# Patient Record
Sex: FEMALE | Race: WHITE | NOT HISPANIC OR LATINO | Employment: OTHER | URBAN - METROPOLITAN AREA
[De-identification: names, ages, dates, MRNs, and addresses within clinical notes are randomized per-mention and may not be internally consistent; named-entity substitution may affect disease eponyms.]

---

## 2023-03-06 DIAGNOSIS — E78.5 HYPERLIPIDEMIA, UNSPECIFIED HYPERLIPIDEMIA TYPE: Primary | ICD-10-CM

## 2023-03-06 RX ORDER — ROSUVASTATIN CALCIUM 5 MG/1
5 TABLET, COATED ORAL DAILY
Qty: 90 TABLET | Refills: 1 | Status: SHIPPED | OUTPATIENT
Start: 2023-03-06 | End: 2023-08-28 | Stop reason: SDUPTHER

## 2023-03-06 RX ORDER — ROSUVASTATIN CALCIUM 5 MG/1
1 TABLET, COATED ORAL DAILY
COMMUNITY
Start: 2012-11-29 | End: 2023-03-06 | Stop reason: SDUPTHER

## 2023-08-28 DIAGNOSIS — E78.5 HYPERLIPIDEMIA, UNSPECIFIED HYPERLIPIDEMIA TYPE: ICD-10-CM

## 2023-08-28 PROBLEM — J98.4 CAVITARY LESION OF LUNG: Status: ACTIVE | Noted: 2023-08-28

## 2023-08-28 PROBLEM — M85.88 OSTEOPENIA OF LUMBAR SPINE: Status: ACTIVE | Noted: 2023-08-28

## 2023-08-28 PROBLEM — D12.6 TUBULAR ADENOMA OF COLON: Status: ACTIVE | Noted: 2023-08-28

## 2023-08-28 PROBLEM — E55.9 VITAMIN D DEFICIENCY: Status: ACTIVE | Noted: 2023-08-28

## 2023-08-28 PROBLEM — R73.9 BORDERLINE HYPERGLYCEMIA: Status: ACTIVE | Noted: 2023-08-28

## 2023-08-28 PROBLEM — J98.4 CAVITARY PNEUMONIA: Status: ACTIVE | Noted: 2023-08-28

## 2023-08-28 PROBLEM — R91.8 LUNG NODULES: Status: ACTIVE | Noted: 2023-08-28

## 2023-08-28 PROBLEM — R91.8 RIGHT LOWER LOBE LUNG MASS: Status: ACTIVE | Noted: 2023-08-28

## 2023-08-28 PROBLEM — J32.9 CHRONIC SINUSITIS: Status: ACTIVE | Noted: 2023-08-28

## 2023-08-28 PROBLEM — F32.A MILD DEPRESSION: Status: ACTIVE | Noted: 2023-08-28

## 2023-08-28 PROBLEM — J45.909 ASTHMA (HHS-HCC): Status: ACTIVE | Noted: 2023-08-28

## 2023-08-28 PROBLEM — J90 PLEURAL EFFUSION: Status: ACTIVE | Noted: 2023-08-28

## 2023-08-28 PROBLEM — N95.2 POSTMENOPAUSAL ATROPHIC VAGINITIS: Status: ACTIVE | Noted: 2023-08-28

## 2023-08-28 PROBLEM — J44.9 COPD (CHRONIC OBSTRUCTIVE PULMONARY DISEASE) (MULTI): Status: ACTIVE | Noted: 2023-08-28

## 2023-08-28 PROBLEM — J18.9 CAVITARY PNEUMONIA: Status: ACTIVE | Noted: 2023-08-28

## 2023-08-28 PROBLEM — H40.9 GLAUCOMA: Status: ACTIVE | Noted: 2023-08-28

## 2023-08-28 PROBLEM — K21.9 ESOPHAGEAL REFLUX: Status: ACTIVE | Noted: 2023-08-28

## 2023-08-28 PROBLEM — R05.9 COUGH: Status: ACTIVE | Noted: 2023-08-28

## 2023-08-28 RX ORDER — BUPROPION HYDROCHLORIDE 150 MG/1
1 TABLET ORAL DAILY
COMMUNITY
Start: 2020-11-19 | End: 2023-09-29 | Stop reason: SDUPTHER

## 2023-08-28 RX ORDER — ROSUVASTATIN CALCIUM 5 MG/1
5 TABLET, COATED ORAL DAILY
Qty: 90 TABLET | Refills: 1 | Status: SHIPPED | OUTPATIENT
Start: 2023-08-28 | End: 2024-03-05 | Stop reason: SDUPTHER

## 2023-09-29 DIAGNOSIS — F32.A MILD DEPRESSION: Primary | ICD-10-CM

## 2023-09-29 RX ORDER — BUPROPION HYDROCHLORIDE 150 MG/1
150 TABLET ORAL DAILY
Qty: 90 TABLET | Refills: 0 | Status: SHIPPED | OUTPATIENT
Start: 2023-09-29 | End: 2023-12-26 | Stop reason: SDUPTHER

## 2023-10-31 DIAGNOSIS — J32.9 CHRONIC SINUSITIS, UNSPECIFIED LOCATION: ICD-10-CM

## 2023-10-31 RX ORDER — FLUTICASONE PROPIONATE AND SALMETEROL 100; 50 UG/1; UG/1
1 POWDER RESPIRATORY (INHALATION)
Qty: 1 EACH | Refills: 0 | Status: SHIPPED | OUTPATIENT
Start: 2023-10-31 | End: 2023-11-14 | Stop reason: DRUGHIGH

## 2023-11-14 ENCOUNTER — OFFICE VISIT (OUTPATIENT)
Dept: PULMONOLOGY | Facility: CLINIC | Age: 78
End: 2023-11-14
Payer: MEDICARE

## 2023-11-14 VITALS
WEIGHT: 143 LBS | DIASTOLIC BLOOD PRESSURE: 62 MMHG | HEART RATE: 70 BPM | BODY MASS INDEX: 26.31 KG/M2 | OXYGEN SATURATION: 97 % | RESPIRATION RATE: 16 BRPM | SYSTOLIC BLOOD PRESSURE: 111 MMHG | TEMPERATURE: 98.2 F | HEIGHT: 62 IN

## 2023-11-14 DIAGNOSIS — J45.40 MODERATE PERSISTENT ASTHMA WITHOUT COMPLICATION (HHS-HCC): ICD-10-CM

## 2023-11-14 DIAGNOSIS — R06.09 DYSPNEA ON EXERTION: Primary | ICD-10-CM

## 2023-11-14 PROCEDURE — 1126F AMNT PAIN NOTED NONE PRSNT: CPT | Performed by: INTERNAL MEDICINE

## 2023-11-14 PROCEDURE — 99214 OFFICE O/P EST MOD 30 MIN: CPT | Performed by: INTERNAL MEDICINE

## 2023-11-14 PROCEDURE — 1159F MED LIST DOCD IN RCRD: CPT | Performed by: INTERNAL MEDICINE

## 2023-11-14 RX ORDER — MONTELUKAST SODIUM 10 MG/1
10 TABLET ORAL NIGHTLY
COMMUNITY
End: 2024-04-24 | Stop reason: SDUPTHER

## 2023-11-14 RX ORDER — OMEPRAZOLE 20 MG/1
20 TABLET, DELAYED RELEASE ORAL
COMMUNITY
End: 2023-12-07 | Stop reason: ALTCHOICE

## 2023-11-14 RX ORDER — FLUTICASONE PROPIONATE AND SALMETEROL 250; 50 UG/1; UG/1
1 POWDER RESPIRATORY (INHALATION)
Qty: 60 EACH | Refills: 3 | Status: SHIPPED | OUTPATIENT
Start: 2023-11-14 | End: 2024-03-19 | Stop reason: SDUPTHER

## 2023-11-14 RX ORDER — CETIRIZINE HYDROCHLORIDE 10 MG/1
10 TABLET ORAL DAILY
COMMUNITY

## 2023-11-14 RX ORDER — ERGOCALCIFEROL 1.25 MG/1
1.25 CAPSULE ORAL
COMMUNITY
End: 2023-12-07 | Stop reason: ALTCHOICE

## 2023-11-14 RX ORDER — ALBUTEROL SULFATE 90 UG/1
2 AEROSOL, METERED RESPIRATORY (INHALATION) EVERY 4 HOURS PRN
Qty: 18 G | Refills: 5 | Status: SHIPPED | OUTPATIENT
Start: 2023-11-14 | End: 2024-11-13

## 2023-11-14 ASSESSMENT — ASTHMA QUESTIONNAIRES
QUESTION_4 LAST FOUR WEEKS HOW OFTEN HAVE YOU USED YOUR RESCUE INHALER OR NEBULIZER MEDICATION (SUCH AS ALBUTEROL): NOT AT ALL
ACT_TOTALSCORE: 21
QUESTION_2 LAST FOUR WEEKS HOW OFTEN HAVE YOU HAD SHORTNESS OF BREATH: MORE THAN ONCE A DAY
QUESTION_5 LAST FOUR WEEKS HOW WOULD YOU RATE YOUR ASTHMA CONTROL: COMPLETELY CONTROLLED
QUESTION_1 LAST FOUR WEEKS HOW MUCH OF THE TIME DID YOUR ASTHMA KEEP YOU FROM GETTING AS MUCH DONE AT WORK, SCHOOL OR AT HOME: NONE OF THE TIME
QUESTION_3 LAST FOUR WEEKS HOW OFTEN DID YOUR ASTHMA SYMPTOMS (WHEEZING, COUGHING, SHORTNESS OF BREATH, CHEST TIGHTNESS OR PAIN) WAKE YOU UP AT NIGHT OR EARLIER THAN USUAL IN THE MORNING: NOT AT ALL

## 2023-11-14 ASSESSMENT — ENCOUNTER SYMPTOMS
LOSS OF SENSATION IN FEET: 0
DEPRESSION: 0
OCCASIONAL FEELINGS OF UNSTEADINESS: 0

## 2023-11-14 ASSESSMENT — COLUMBIA-SUICIDE SEVERITY RATING SCALE - C-SSRS
6. HAVE YOU EVER DONE ANYTHING, STARTED TO DO ANYTHING, OR PREPARED TO DO ANYTHING TO END YOUR LIFE?: NO
2. HAVE YOU ACTUALLY HAD ANY THOUGHTS OF KILLING YOURSELF?: NO
1. IN THE PAST MONTH, HAVE YOU WISHED YOU WERE DEAD OR WISHED YOU COULD GO TO SLEEP AND NOT WAKE UP?: NO

## 2023-11-14 ASSESSMENT — PAIN SCALES - GENERAL: PAINLEVEL: 0-NO PAIN

## 2023-11-15 NOTE — PROGRESS NOTES
Department of Medicine  Division of Pulmonary, Critical Care, and Sleep Medicine  Follow-Up Visit  [facility] - [address], [address]    Physician HPI (11/14/2023):  Mrs Kinsey is a 78-year-old female with hx of Asthma (clinical diagnosis), Tobacco use, b/l Pneumonia in November 2016 and chronic sinusitis. She presented to clinic today for follow up.      Interval history 05/13/2019:  Latrice has been doing fairly well since last visit. She is currently on Low-dose of Advair. She has not had any recent respiratory infection or asthma exacerbation. She does report upper airway congestion with seasonal allergies. No persistent cough or increased sputum production. She does not feel she needs to use albuterol frequently.        Follow-up 3/12/2021  Latrice reports stable respiratory status over the past year with no interval history of asthma exacerbation. She is tolerating low-dose of Advair and montelukast. She has not needed to use albuterol very often. Weight has been stable. No purulent sputum production or recurrent fevers.     Follow up 9/15/2021:  Latrice reports a recent presentation to outside hospital ER with acute shortness of breath and right-sided chest pain. CT scan of chest at that time revealed a new cavitary lesion in the right lower lobe that was not seen on previous CT scan. She was recommended to be admitted to the hospital for treatment though she preferred to go home. She was started on third-generation cephalosporin and doxycycline. She reports some clinical improvement since she was started on antibiotic such as improved cough, improved night sweats and right-sided chest pain. She denies acute hemoptysis. No past history of autoimmune diseases or inflammatory arthritis. No acute fevers now.     Follow up 3/14/2022:  Latrice presented to the office today for follow-up. She reports good recovery since her VATS surgery in October 2021. Repeated chest x-ray on October 21 revealed significant  improvement in the right lower lobe pneumonia and complex pleural effusion. She noticed mild restriction with deep inspiration on the right side since surgery but no significant pain. She denies persistent cough or purulent sputum production. Oxygenation has been stable.     Follow up 11/14/2023:  Latrice presented to the office today for follow up. Reports chronic dyspnea on exertion with moderate activities around the home such as climbing more than one flight of stairs. Symptoms usually improve with short acting bronchodilators. No interval hx of acute asthma exacerbation that required hospital admission or ER visits. She is using Advair 100mcg BID. No orthopnea or LE edema. No exertional chest pain, palpitation, diaphoresis, presyncope or syncope.     Immunization History   Administered Date(s) Administered    Moderna COVID-19 vaccine, bivalent, blue cap/gray label *Check age/dose* 10/13/2022    Moderna SARS-CoV-2 Vaccination 02/11/2021, 03/09/2021, 11/18/2021, 05/11/2022    Pneumococcal conjugate vaccine, 13-valent (PREVNAR 13) 10/28/2015    Pneumococcal polysaccharide vaccine, 23-valent, age 2 years and older (PNEUMOVAX 23) 10/21/2013    Td vaccine, age 7 years and older (TDVAX) 03/24/2011       Current Outpatient Medications   Medication Instructions    albuterol (Ventolin HFA) 90 mcg/actuation inhaler 2 puffs, inhalation, Every 4 hours PRN    buPROPion XL (WELLBUTRIN XL) 150 mg, oral, Daily    cetirizine (ZYRTEC) 10 mg, oral, Daily    ergocalciferol (VITAMIN D-2) 1.25 mg, oral, Weekly    fluticasone propion-salmeteroL (Advair Diskus) 250-50 mcg/dose diskus inhaler 1 puff, inhalation, 2 times daily RT, Rinse mouth with water after use to reduce aftertaste and incidence of candidiasis. Do not swallow.    montelukast (SINGULAIR) 10 mg, oral, Nightly    omeprazole OTC (PRILOSEC OTC) 20 mg, oral, Daily before breakfast, Do not crush, chew, or split.    rosuvastatin (CRESTOR) 5 mg, oral, Daily     "    Allergies:  Allergies   Allergen Reactions    Amoxicillin-Pot Clavulanate Other    Brimonidine Unknown     Eyes    Cpm-Pseudoephed-Acetaminophen Hives     Patient can take regular tylenol    Doxylamin-Pse-Dm-Acetaminophen Unknown    Fentanyl Unknown    Hyoscyamine Sulfate Unknown    Levofloxacin Other    Travoprost Swelling     Eyes    Codeine Rash     Nausea & Vomiting          Visit Vitals  /62 (BP Location: Left arm, Patient Position: Sitting, BP Cuff Size: Adult)   Pulse 70   Temp 36.8 °C (98.2 °F) (Temporal)   Resp 16   Ht 1.575 m (5' 2\")   Wt 64.9 kg (143 lb)   SpO2 97%   BMI 26.16 kg/m²   Smoking Status Former   BSA 1.69 m²        Physical Exam  Vitals and nursing note reviewed.   Constitutional:       General: She is not in acute distress.     Appearance: Normal appearance.   HENT:      Head: Normocephalic and atraumatic.      Nose: Nose normal.      Mouth/Throat:      Mouth: Mucous membranes are moist.   Eyes:      Conjunctiva/sclera: Conjunctivae normal.   Cardiovascular:      Rate and Rhythm: Normal rate and regular rhythm.   Pulmonary:      Effort: Pulmonary effort is normal. No respiratory distress.      Breath sounds: Normal breath sounds. No stridor. No wheezing or rhonchi.   Musculoskeletal:      Cervical back: Neck supple.   Skin:     General: Skin is warm and dry.      Coloration: Skin is not jaundiced.   Neurological:      General: No focal deficit present.      Mental Status: She is alert and oriented to person, place, and time. Mental status is at baseline.   Psychiatric:         Mood and Affect: Mood normal.         Behavior: Behavior normal.         Judgment: Judgment normal.              Chest Radiograph     CHEST 2 VIEW 10/21/2021    Narrative  MRN: [id number]  Patient Name: TI HENSON    STUDY:   CHEST 2 VIEW PA AND LAT;  10/21/2021 1:15 pm    INDICATION:  post op followup.    COMPARISON:  10/05/2021 chest radiograph    ACCESSION NUMBER(S):  [id number]    ORDERING " CLINICIAN:  CELINA BILLS    FINDINGS:  PA and lateral radiographs of the chest were provided.    Metallic clips are again seen projecting over the upper abdomen.    CARDIOMEDIASTINAL SILHOUETTE:  Cardiac silhouette is at the top limits of normal in size.    LUNGS:  Small residual right pleural effusion and right basilar atelectasis.  No discernible pneumothorax. The left lung is now clear. No pulmonary  edema.    ABDOMEN:  No remarkable upper abdominal findings.    BONES:  No acute osseous changes.    Impression  1.  Small residual right pleural effusion and mild right basilar  atelectasis.      Laboratory Studies     Lab Results   Component Value Date    WBC 12.0 (H) 10/05/2021    HGB 8.6 (L) 10/05/2021    HCT 27.5 (L) 10/05/2021    MCV 84 10/05/2021     (H) 10/05/2021      Lab Results   Component Value Date    GLUCOSE 85 10/05/2021    CALCIUM 8.3 (L) 10/05/2021     10/05/2021    K 3.9 10/05/2021    CO2 26 10/05/2021     10/05/2021    BUN 15 10/05/2021    CREATININE 0.82 10/05/2021      Lab Results   Component Value Date    ALT 9 09/26/2021    AST 14 09/26/2021    ALKPHOS 88 09/26/2021    BILITOT 1.1 09/26/2021        Protime   Date/Time Value Ref Range Status   10/04/2021 04:30 AM 15.0 (H) 10.1 - 13.3 sec Final     INR   Date/Time Value Ref Range Status   10/04/2021 04:30 AM 1.3 (H) 0.9 - 1.1 Final       Assessment and Plan / Recommendations   1. Dyspnea on exertion:  ? Exercise related asthma activity. Most recent lab work revealed anemia which could be contributing. No recent CBC though. She is scheduled for lab work by PCP. Lung exam revealed mild expiratory wheezes today. Euvolemic on exam. Resting RA O2 saturation is within normal range. We discussed to proceed with CBC test, complete PFTs and CXR PA/Lateral. F/U in 3-4 weeks to follow up work up results.      2. Asthma: Moderately persistent symptoms:  Change Advair dose to 250 mcg BID.  Use Ventolin as needed and 20-30 min prior to  exercise.   Continue on Montelukast.      3. GERD:  Continue on PPI     Please excuse any misspellings or unintended errors related to the Dragon speech recognition software used to dictate this note.     Lawrence Balderas MD   11/14/2023

## 2023-11-16 DIAGNOSIS — E78.5 HYPERLIPIDEMIA, UNSPECIFIED HYPERLIPIDEMIA TYPE: ICD-10-CM

## 2023-11-16 DIAGNOSIS — R73.9 BORDERLINE HYPERGLYCEMIA: Primary | ICD-10-CM

## 2023-11-16 DIAGNOSIS — Z12.31 VISIT FOR SCREENING MAMMOGRAM: ICD-10-CM

## 2023-11-16 DIAGNOSIS — E55.9 VITAMIN D DEFICIENCY: ICD-10-CM

## 2023-11-16 DIAGNOSIS — M81.0 AGE-RELATED OSTEOPOROSIS WITHOUT CURRENT PATHOLOGICAL FRACTURE: Primary | ICD-10-CM

## 2023-12-06 NOTE — PROGRESS NOTES
"Subjective   Reason for Visit: Latrice Kinsey is an 78 y.o. female here for her Subsequent Medicare Assessment          Her oldest son is staying with her. In June 2024 he will have been there 2 years. She is going to make him move out in June. His ex- wife passed a year ago \"she drank herself to death\"  He is not able to see his children. The grandchildren are with their mothers parents     She is no longer taking any NSAIDs and only take Tylenol prn     She did a PFT and CXR this morning and will talk with pulm on 1/19/2023 to discuss the results of testing   She is easily SOB when she is exercising     The size of her stool is smaller in diameter.  She has abdominal cramps or LBP with BM and BM tends to be soft  She denies hematochezia. She is not experiencing abdominal bloating     She has an appt with ophth next week 12/2023.     She is asking for people to repeat themselves more often.  She is not ready for hearing aids     HEALTH:  PAP 10/13 last one   Mammo 11/15 , 5/17,11/6/17, 11/18, 11/19, 11/2020, 11/2021, 11/2022, 11/2023  BD 10/13 and T-0.3 and Q 5, 12/18, 12/3/21 T-1.6, 11/2023 T-2.5 hip  -- FRAX score = 16 % 11/2023  Colon 6/11, 8/16 and Q 5   EKG 10/13 -, 10/15 , 11/17-, 5/19, 11/2020, 9/2021,   Urine 10/13, 10/15, 11/16, 11/17, 11/18, 11/19  Hep C 11/18 -  Flu does not get due to egg white allergy   TD 2011, will update with injury   RSV discussed and will consider   Pneumovax 2013 last one   Prevnar 10/15   Zostavax never and declined   Shingrix recommended and will check local pharmacies   Moderna CVD 2/2021 and 3/2021 booster 11/2021, 5/2022, 10/2022    Ophth- She has glaucoma  and on Azopt and Dorzolamide OU. She sees Dr Ayala, glaucoma specialist. Last visit 4/2023      Patient Care Team:  Luiza Manzo MD as PCP - General  Luiza Manzo MD as PCP - [facility] Attributed Provider     Review of Systems  All systems negative except those listed in the HPI      Past Medical, Surgical, and " Family History reviewed and updated in chart.  Reviewed all medications by prescribing practitioner or clinical pharmacist   (such as prescriptions, OTCs, herbal therapies and supplements) and documented in the medical record      Objective   Vitals:  Visit Vitals  /70 (BP Location: Left arm, Patient Position: Sitting)   Pulse 64   Temp 36.3 °C (97.4 °F) (Temporal)    Body mass index is 26.21 kg/m².      Physical Exam  Vitals reviewed.   Constitutional:       Appearance: Normal appearance.   HENT:      Head: Normocephalic.      Right Ear: Tympanic membrane, ear canal and external ear normal.      Left Ear: Tympanic membrane, ear canal and external ear normal.      Nose: Nose normal.      Mouth/Throat:      Pharynx: Oropharynx is clear.   Eyes:      Conjunctiva/sclera: Conjunctivae normal.   Cardiovascular:      Rate and Rhythm: Normal rate and regular rhythm.      Pulses: Normal pulses.      Heart sounds: Normal heart sounds.      Comments: Regular, no murmurs   Pulmonary:      Effort: Pulmonary effort is normal.      Breath sounds: Normal breath sounds.   Abdominal:      General: Bowel sounds are normal.      Palpations: Abdomen is soft.   Musculoskeletal:         General: Normal range of motion.      Cervical back: Normal range of motion and neck supple.   Skin:     General: Skin is warm.   Neurological:      General: No focal deficit present.      Mental Status: She is alert and oriented to person, place, and time.   Psychiatric:         Mood and Affect: Mood normal.         Behavior: Behavior normal.         Thought Content: Thought content normal.         Judgment: Judgment normal.       Assessment/Plan   Problem List Items Addressed This Visit       Asthma    Borderline hyperglycemia    Cavitary lesion of lung    RESOLVED: COPD (chronic obstructive pulmonary disease) (CMS/LTAC, located within [facility])    Esophageal reflux    Glaucoma    Hyperlipidemia    Mild depression    Right lower lobe lung mass     Other Visit Diagnoses   "     Healthcare maintenance    -  Primary    Abdominal cramps               Subsequent Medicare Assessment completed  Labs    Glucose 115  Cr 1.25 stable  Mildly elevated alk phos at 126  CBC- low lymphocytes and high eosinophils   HbA1c 6.0%  TSH 3.55     Medicare Wellness completed  -  Discussed healthy diet and regular exercise.    -  Physical exam overall unremarkable. Immunizations reviewed and updated accordingly. Healthy lifestyle choices discussed (tobacco avoidance, appropriate alcohol use, avoidance of illicit substances).   -  Patient is wearing seatbelt.   -  Screening lab work ordered as indicated.    -  Age appropriate screening tests reviewed with patient.        She was  in 6/2022 with 2 sons   She has a previous hx of tobacco use, quit in 2001, smoked for 30 years.    Her  passed in 6/2022, he had dementia   Her oldest son is staying with her. In June 2024 he will have been there 2 years   His ex- wife passed a year ago \"she drank herself to death\"  He is not able to see his children. The grandchildren are with their mothers parents     We spent 15 minutes discussing depression screen and there is nothing found that is of concern for underling depression. The PQH form was filled and the meds reviewed. No depression to report      We spent 15 minutes discussing alcohol use and there are no concerns about overuse. The 15 min was spent in going over any issues of use of alcohol. No EtOH use.      She has grab bars in the shower.  She has not fallen recently and no risk of falls in the house   She has good lighting around the house and functioning smoke detectors.      She has noticed not hearing   She declines audiology consult 11/2022  She saw ENT and they did do a hearing test and hearing loss was mild      Her weight in office today is 141 with BMI of 26.21. We spent 15 minutes discussing diet and weight loss. The struggle of weight loss persists    Explained goal for BMI to be 25 or " less. She restarted bowling   Recommend she remain busy throughout the day      Her BP have been in normal range in office. We will continue to monitor.   She has episodes of increased HR when her anxiety is elevated   EKG 9/2021 showed sinus tach at 107. Normal axis, normal intervals   Recommend she monitor her BP at home and call with elevated readings      I have spent 15 min face to face with this patient discussing their cardiac risk and behavioral therapies of nutrition choices and exercise. We are trying to eliminate habits that are contributing to their cardiac risk.  We agreed on a plan of how they can reduce their current CV risk   ACO score 3/6 IO 12/2023     HLD:    Explained goal for LDL to be less than 70 ideally   Continue rosuvastatin 5 mg QD   Ca cardiac score + 246. Explained goal for score to be less than 100 11/2020      Borderline hyperglycemia: glucose 115 and HbA1c 6 % on labs in 11/2023  Explained this is medication related   Discussed making healthier food choices and increase exercise   Avoid processed meats and sugars.   Recommend she look into a plant based/ whole foods diet      s/p [place] [facility] [place]/2021:   Admitted on 9/25/2021 and discharged on 9/30/2021 for empyema:  She was noted to be tachycardiac and tachypneic on arrival   CXR 9/2021 showed moderate right sided pleural effusion with right basilar opacity concerning for combination of mass versus pneumonia versus compressive atelectasis   CT on 9/9/2021 showed partially cavitating masslike consolidation in the medial right lower lung   US 9/2021 showed loculated pleural effusion with hyperechoic fluid suggestive of parapneumonic effusion or empyema  CT of chest 9/2021 showed significant large complex loculated right pleural effusion with areas of low attenuation in right lower lobe suggestive of intrapulmonary abscesses   Repeat CT 9/29/2021 improvement in right pleural effusions however pockets of  "loculations still present   Cytology negative for malignant cells   She saw Dr Castellanos in 3/2022     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   Improved and needs to keep hydrated   She is to avoid NSAIDs but can take Tylenol prn     Asthma : She did a PFT and CXR this morning and will talk with pulm on 1/19/2023 to discuss the results of testing   Continue Advair 250/50 mg and Ventolin inhaler along with Singulair 10 mg daily.   Pulm says asthmatic bronchitis and not asthma   CXR 11/1/16 was no change  PFT done 11/12/18 and decreased overall volume with Dr Castellanos.   She saw Dr Castellanos in 11/2023     GERD:  Continue omeprazole 20 mg QOD or 5 days a week.   Being on omeprazole has improved her kidney function   EGD in 11/19 was normal      Mild depression that is situational mostly:    Her  passed in 6/2022. They were together for 48 years.   She did grief therapy.    She has 2- 3 close friends that she can call on when her son is not around   Her oldest son is staying with her. In June 2024 he will have been there 2 years   His ex- wife passed a year ago \"she drank herself to death\"  He is not able to see his children. The grandchildren are with their mothers parents   She could not tolerate side effects with Lexapro.   Continue Wellbutrin  mg daily   Discussed acupuncture and mindfulness.  She can do chair Yoga or [employer]      Constipation:  Family Hx of sister and mother with rectal Ca so she gets colonoscopy every 5 yrs.   Colon 11/20/19 and Q 5 EGD 11/2020 was normal      Bilateral hip pain:  Recommend she try OTC Voltaren Gel prn  Avoid NSAIDs. She can take Tylenol prn   Recommend she try OTC pain relieving creams with Lidocaine prn      Right knee pain:  She had orthoscopic surgery for the meniscus tear.  She still wears the brace because she gets swelling back of the knee and medially   Avoid NSAIDs. 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 She had a Moh's and then did topical chemo for 2 days    She had a wart removed from her 5th toe with Dr Brandon in 2022  She is seeing Dr Brandon in dermatology routinely      Ridges in fingernails:  Recommend she try Biotin 5 mg daily      Vitamin D deficiency - levels 49.8 on labs in 11/2021  Continue scripted vitamin D once weekly.     PAP normal in 10/13 and no does not need another one  Vaginal atrophy is controlled with Estrace cream 2 x weekly and will continue  She had good results with Vagifem but it was too costly     Mammo was normal in 11/2023.   Breast exam normal 12/2023  BD 12/3/21 T-2.5 hip. Continue OTC Calcium 600 mg BID, scripted Vitamin D weekly and eat 2 servings of calcium enriched foods daily.   Discussed importance of weight bearing exercises   FRAX score = 16 % 11/2023     Colon 11/20/19 and Q 5   EGD 11/2020 was normal      Ophth- She has an appt with ophth next week 12/2023.   She has glaucoma  and on Azopt and Dorzolamide OU. She sees Dr Ayala, glaucoma specialist. Last visit 4/2023     I spent 15 min with the patient discussing their wishes for end of life choices.   We discussed the need for a Living Will and that wishes should be discussed with Family. The DNR status was reviewed, and we discussed the options of this and, the DNR _CC options as well.   We also went over how important it was to have these choices written down and clear for any surviving family so that their wishes are followed   The patient and I came to to following agreement : Her sister (Gissel) is her medical POA and she has a living will. Her sister is aware of her wishes.   She is DNR for heroic measures. She wants to be cremated   Recommend she bring a copy of her LW and MPOA in office to  have scanned in her chart 12/2023     Hep C 11/18 -  Flu does not get due to egg white allergy   TD 2011, will update with injury   RSV discussed and will consider   Pneumovax 2013 last one   Prevnar 10/15   Zostavax never and declined   Shingrix recommended and will check local pharmacies   Moderna CVD 2/2021 and 3/2021 booster 11/2021, 5/2022, 10/2022       Some elements in the chart were copied from Dr. Manzo's last office visit with patient.   Notes have been updated where appropriate, and reflect my current medical decision making from today.      Return in 6 months for follow up or sooner if needed   (MCR due 12/2024)    Scribe Attestation  By signing my name below, I, Josie Hector , Scribe   attest that this documentation has been prepared under the direction and in the presence of Luiza Manzo MD.

## 2023-12-07 ENCOUNTER — HOSPITAL ENCOUNTER (OUTPATIENT)
Dept: RADIOLOGY | Facility: HOSPITAL | Age: 78
Discharge: HOME | End: 2023-12-07
Payer: MEDICARE

## 2023-12-07 ENCOUNTER — HOSPITAL ENCOUNTER (OUTPATIENT)
Dept: RESPIRATORY THERAPY | Facility: HOSPITAL | Age: 78
Discharge: HOME | End: 2023-12-07
Payer: MEDICARE

## 2023-12-07 ENCOUNTER — OFFICE VISIT (OUTPATIENT)
Dept: PRIMARY CARE | Facility: CLINIC | Age: 78
End: 2023-12-07
Payer: MEDICARE

## 2023-12-07 VITALS
HEIGHT: 62 IN | BODY MASS INDEX: 25.95 KG/M2 | HEART RATE: 64 BPM | DIASTOLIC BLOOD PRESSURE: 70 MMHG | OXYGEN SATURATION: 99 % | TEMPERATURE: 97.4 F | SYSTOLIC BLOOD PRESSURE: 120 MMHG | WEIGHT: 141 LBS

## 2023-12-07 DIAGNOSIS — R73.9 BORDERLINE HYPERGLYCEMIA: ICD-10-CM

## 2023-12-07 DIAGNOSIS — R91.8 RIGHT LOWER LOBE LUNG MASS: ICD-10-CM

## 2023-12-07 DIAGNOSIS — R06.09 DYSPNEA ON EXERTION: ICD-10-CM

## 2023-12-07 DIAGNOSIS — R10.9 ABDOMINAL CRAMPS: ICD-10-CM

## 2023-12-07 DIAGNOSIS — F32.A MILD DEPRESSION: ICD-10-CM

## 2023-12-07 DIAGNOSIS — K21.00 GASTROESOPHAGEAL REFLUX DISEASE WITH ESOPHAGITIS WITHOUT HEMORRHAGE: ICD-10-CM

## 2023-12-07 DIAGNOSIS — Z00.00 HEALTHCARE MAINTENANCE: Primary | ICD-10-CM

## 2023-12-07 DIAGNOSIS — E78.2 MIXED HYPERLIPIDEMIA: ICD-10-CM

## 2023-12-07 DIAGNOSIS — J98.4 CAVITARY LESION OF LUNG: ICD-10-CM

## 2023-12-07 DIAGNOSIS — H40.1132 PRIMARY OPEN ANGLE GLAUCOMA (POAG) OF BOTH EYES, MODERATE STAGE: ICD-10-CM

## 2023-12-07 DIAGNOSIS — J41.1 MUCOPURULENT CHRONIC BRONCHITIS (MULTI): ICD-10-CM

## 2023-12-07 DIAGNOSIS — J45.42 MODERATE PERSISTENT ASTHMA WITH STATUS ASTHMATICUS (HHS-HCC): ICD-10-CM

## 2023-12-07 PROBLEM — R19.4 CHANGE IN BOWEL HABITS: Status: ACTIVE | Noted: 2019-11-07

## 2023-12-07 PROBLEM — R10.31 RIGHT LOWER QUADRANT PAIN: Status: ACTIVE | Noted: 2019-11-07

## 2023-12-07 PROBLEM — Z86.0100 HISTORY OF COLON POLYPS: Status: ACTIVE | Noted: 2019-11-07

## 2023-12-07 PROBLEM — J44.9 COPD (CHRONIC OBSTRUCTIVE PULMONARY DISEASE) (MULTI): Status: RESOLVED | Noted: 2023-08-28 | Resolved: 2023-12-07

## 2023-12-07 PROBLEM — Z86.010 HISTORY OF COLON POLYPS: Status: ACTIVE | Noted: 2019-11-07

## 2023-12-07 PROBLEM — Z98.890 S/P NISSEN FUNDOPLICATION (WITHOUT GASTROSTOMY TUBE) PROCEDURE: Status: ACTIVE | Noted: 2019-11-07

## 2023-12-07 PROCEDURE — 94729 DIFFUSING CAPACITY: CPT | Performed by: INTERNAL MEDICINE

## 2023-12-07 PROCEDURE — 1159F MED LIST DOCD IN RCRD: CPT | Performed by: INTERNAL MEDICINE

## 2023-12-07 PROCEDURE — G0442 ANNUAL ALCOHOL SCREEN 15 MIN: HCPCS | Performed by: INTERNAL MEDICINE

## 2023-12-07 PROCEDURE — 94060 EVALUATION OF WHEEZING: CPT

## 2023-12-07 PROCEDURE — 94729 DIFFUSING CAPACITY: CPT

## 2023-12-07 PROCEDURE — 71046 X-RAY EXAM CHEST 2 VIEWS: CPT | Performed by: RADIOLOGY

## 2023-12-07 PROCEDURE — G0439 PPPS, SUBSEQ VISIT: HCPCS | Performed by: INTERNAL MEDICINE

## 2023-12-07 PROCEDURE — 1126F AMNT PAIN NOTED NONE PRSNT: CPT | Performed by: INTERNAL MEDICINE

## 2023-12-07 PROCEDURE — 94060 EVALUATION OF WHEEZING: CPT | Performed by: INTERNAL MEDICINE

## 2023-12-07 PROCEDURE — 1160F RVW MEDS BY RX/DR IN RCRD: CPT | Performed by: INTERNAL MEDICINE

## 2023-12-07 PROCEDURE — 99214 OFFICE O/P EST MOD 30 MIN: CPT | Performed by: INTERNAL MEDICINE

## 2023-12-07 PROCEDURE — 71046 X-RAY EXAM CHEST 2 VIEWS: CPT

## 2023-12-07 PROCEDURE — 94726 PLETHYSMOGRAPHY LUNG VOLUMES: CPT | Performed by: INTERNAL MEDICINE

## 2023-12-07 PROCEDURE — G0446 INTENS BEHAVE THER CARDIO DX: HCPCS | Performed by: INTERNAL MEDICINE

## 2023-12-07 PROCEDURE — G0444 DEPRESSION SCREEN ANNUAL: HCPCS | Performed by: INTERNAL MEDICINE

## 2023-12-07 RX ORDER — BUDESONIDE 0.5 MG/2ML
0.5 INHALANT ORAL
COMMUNITY
Start: 2021-10-06 | End: 2023-12-07 | Stop reason: ALTCHOICE

## 2023-12-07 RX ORDER — PANTOPRAZOLE SODIUM 40 MG/1
1 TABLET, DELAYED RELEASE ORAL
COMMUNITY
Start: 2021-09-29 | End: 2023-12-07 | Stop reason: ALTCHOICE

## 2023-12-07 RX ORDER — OMEPRAZOLE 20 MG/1
20 CAPSULE, DELAYED RELEASE ORAL
COMMUNITY

## 2023-12-07 RX ORDER — LORATADINE 10 MG/1
1 TABLET ORAL DAILY
COMMUNITY
Start: 2021-09-29 | End: 2023-12-07 | Stop reason: ALTCHOICE

## 2023-12-07 RX ORDER — DORZOLAMIDE HCL 20 MG/ML
1 SOLUTION/ DROPS OPHTHALMIC 2 TIMES DAILY
COMMUNITY

## 2023-12-07 ASSESSMENT — ENCOUNTER SYMPTOMS
LOSS OF SENSATION IN FEET: 0
OCCASIONAL FEELINGS OF UNSTEADINESS: 0
DEPRESSION: 0

## 2023-12-07 ASSESSMENT — PATIENT HEALTH QUESTIONNAIRE - PHQ9
1. LITTLE INTEREST OR PLEASURE IN DOING THINGS: NOT AT ALL
2. FEELING DOWN, DEPRESSED OR HOPELESS: NOT AT ALL
SUM OF ALL RESPONSES TO PHQ9 QUESTIONS 1 AND 2: 0

## 2023-12-07 ASSESSMENT — ACTIVITIES OF DAILY LIVING (ADL)
TAKING_MEDICATION: INDEPENDENT
DOING_HOUSEWORK: INDEPENDENT
MANAGING_FINANCES: INDEPENDENT
GROCERY_SHOPPING: INDEPENDENT

## 2023-12-09 LAB
MGC ASCENT PFT - FEV1 - POST: 1.49
MGC ASCENT PFT - FEV1 - PRE: 1.38
MGC ASCENT PFT - FEV1 - PREDICTED: 1.87
MGC ASCENT PFT - FVC - POST: 2.69
MGC ASCENT PFT - FVC - PRE: 2.66
MGC ASCENT PFT - FVC - PREDICTED: 2.41

## 2023-12-19 ENCOUNTER — TELEMEDICINE (OUTPATIENT)
Dept: PULMONOLOGY | Facility: CLINIC | Age: 78
End: 2023-12-19
Payer: MEDICARE

## 2023-12-19 DIAGNOSIS — J45.40 MODERATE PERSISTENT ASTHMA WITHOUT COMPLICATION (HHS-HCC): Primary | ICD-10-CM

## 2023-12-19 PROCEDURE — 99441 PR PHYS/QHP TELEPHONE EVALUATION 5-10 MIN: CPT | Performed by: INTERNAL MEDICINE

## 2023-12-26 DIAGNOSIS — F32.A MILD DEPRESSION: ICD-10-CM

## 2023-12-26 RX ORDER — BUPROPION HYDROCHLORIDE 150 MG/1
150 TABLET ORAL DAILY
Qty: 90 TABLET | Refills: 2 | Status: SHIPPED | OUTPATIENT
Start: 2023-12-26

## 2024-01-02 NOTE — PROGRESS NOTES
Department of Medicine  Division of Pulmonary, Critical Care, and Sleep Medicine  Follow-Up Visit  [facility] - [address], [address]    Physician HPI:  Mrs Kinsey is a 78-year-old female with hx of Asthma (clinical diagnosis), Tobacco use, b/l Pneumonia in November 2016 and chronic sinusitis. She presented to clinic today for follow up.      Interval history 05/13/2019:  Latrice has been doing fairly well since last visit. She is currently on Low-dose of Advair. She has not had any recent respiratory infection or asthma exacerbation. She does report upper airway congestion with seasonal allergies. No persistent cough or increased sputum production. She does not feel she needs to use albuterol frequently.     Follow-up 3/12/2021  Latrice reports stable respiratory status over the past year with no interval history of asthma exacerbation. She is tolerating low-dose of Advair and montelukast. She has not needed to use albuterol very often. Weight has been stable. No purulent sputum production or recurrent fevers.     Follow up 9/15/2021:  Latrice reports a recent presentation to outside hospital ER with acute shortness of breath and right-sided chest pain. CT scan of chest at that time revealed a new cavitary lesion in the right lower lobe that was not seen on previous CT scan. She was recommended to be admitted to the hospital for treatment though she preferred to go home. She was started on third-generation cephalosporin and doxycycline. She reports some clinical improvement since she was started on antibiotic such as improved cough, improved night sweats and right-sided chest pain. She denies acute hemoptysis. No past history of autoimmune diseases or inflammatory arthritis. No acute fevers now.     Follow up 3/14/2022:  Latrice presented to the office today for follow-up. She reports good recovery since her VATS surgery in October 2021. Repeated chest x-ray on October 21 revealed significant improvement in the right  lower lobe pneumonia and complex pleural effusion. She noticed mild restriction with deep inspiration on the right side since surgery but no significant pain. She denies persistent cough or purulent sputum production. Oxygenation has been stable.     Follow up 11/14/2023:  Latrice presented to the office today for follow up. Reports chronic dyspnea on exertion with moderate activities around the home such as climbing more than one flight of stairs. Symptoms usually improve with short acting bronchodilators. No interval hx of acute asthma exacerbation that required hospital admission or ER visits. She is using Advair 100mcg BID. No orthopnea or LE edema. No exertional chest pain, palpitation, diaphoresis, presyncope or syncope.     Follow up 12/19/2023: (phone visit)  Respiratory status has been stable since her recent office visits.  Chest x-ray did not show acute parenchymal infiltrates.  Pulmonary function test revealed mild airflow obstruction with normal vital capacity and normal DLCO.  She is currently using a medium dose of Advair twice per day and reports to feel better with it.    Immunization History   Administered Date(s) Administered    Moderna COVID-19 vaccine, bivalent, blue cap/gray label *Check age/dose* 10/13/2022    Moderna SARS-CoV-2 Vaccination 02/11/2021, 03/09/2021, 11/18/2021, 05/11/2022    Pneumococcal conjugate vaccine, 13-valent (PREVNAR 13) 10/28/2015    Pneumococcal polysaccharide vaccine, 23-valent, age 2 years and older (PNEUMOVAX 23) 10/21/2013    Td vaccine, age 7 years and older (TDVAX) 03/24/2011    Tdap vaccine, age 7 year and older (BOOSTRIX) 01/22/2023       Current Outpatient Medications   Medication Instructions    albuterol (Ventolin HFA) 90 mcg/actuation inhaler 2 puffs, inhalation, Every 4 hours PRN    buPROPion XL (WELLBUTRIN XL) 150 mg, oral, Daily    cetirizine (ZYRTEC) 10 mg, oral, Daily    dorzolamide (Trusopt) 2 % ophthalmic solution 1 drop, Both Eyes, 2 times daily     fluticasone propion-salmeteroL (Advair Diskus) 250-50 mcg/dose diskus inhaler 1 puff, inhalation, 2 times daily RT, Rinse mouth with water after use to reduce aftertaste and incidence of candidiasis. Do not swallow.    montelukast (SINGULAIR) 10 mg, oral, Nightly    omeprazole (PRILOSEC) 20 mg, oral, Daily before breakfast    rosuvastatin (CRESTOR) 5 mg, oral, Daily        Allergies:  Allergies   Allergen Reactions    Amoxicillin-Pot Clavulanate Other    Brimonidine Unknown     Eyes    Cpm-Pseudoephed-Acetaminophen Hives     Patient can take regular tylenol    Doxylamin-Pse-Dm-Acetaminophen Unknown    Fentanyl Unknown    Hyoscyamine Sulfate Unknown    Levofloxacin Other    Pentobarbital Other    Phenylephrine-Acetaminophen Other    Travoprost Swelling     Eyes    Codeine Rash     Nausea & Vomiting          Visit Vitals  Smoking Status Former        Chest Radiograph     CHEST 2 VIEW 10/21/2021    Narrative  MRN: [id number]  Patient Name: TI HENSON    STUDY:   CHEST 2 VIEW PA AND LAT;  10/21/2021 1:15 pm    INDICATION:  post op followup.    COMPARISON:  10/05/2021 chest radiograph    ACCESSION NUMBER(S):  [id number]    ORDERING CLINICIAN:  CELINA BILLS    FINDINGS:  PA and lateral radiographs of the chest were provided.    Metallic clips are again seen projecting over the upper abdomen.    CARDIOMEDIASTINAL SILHOUETTE:  Cardiac silhouette is at the top limits of normal in size.    LUNGS:  Small residual right pleural effusion and right basilar atelectasis.  No discernible pneumothorax. The left lung is now clear. No pulmonary  edema.    ABDOMEN:  No remarkable upper abdominal findings.    BONES:  No acute osseous changes.    Impression  1.  Small residual right pleural effusion and mild right basilar  atelectasis.      Laboratory Studies     Lab Results   Component Value Date    WBC 12.0 (H) 10/05/2021    HGB 8.6 (L) 10/05/2021    HCT 27.5 (L) 10/05/2021    MCV 84 10/05/2021     (H) 10/05/2021      Lab  Results   Component Value Date    GLUCOSE 85 10/05/2021    CALCIUM 8.3 (L) 10/05/2021     10/05/2021    K 3.9 10/05/2021    CO2 26 10/05/2021     10/05/2021    BUN 15 10/05/2021    CREATININE 0.82 10/05/2021      Lab Results   Component Value Date    ALT 9 09/26/2021    AST 14 09/26/2021    ALKPHOS 88 09/26/2021    BILITOT 1.1 09/26/2021        Protime   Date/Time Value Ref Range Status   10/04/2021 04:30 AM 15.0 (H) 10.1 - 13.3 sec Final     INR   Date/Time Value Ref Range Status   10/04/2021 04:30 AM 1.3 (H) 0.9 - 1.1 Final       Assessment and Plan / Recommendations     1. Dyspnea on exertion:  ? Exercise related asthma activity. Most recent lab work revealed anemia which could be contributing. No recent CBC though. She is scheduled for lab work by PCP. Lung exam revealed mild expiratory wheezes today. Euvolemic on exam. Resting RA O2 saturation is within normal range.     Follow up 12/19/2023: We discussed chest x-ray and pulmonary function test results.  Will continue on medium dose of Advair right now.  CBC did not show significant anemia.  Follow-up in 6 months or sooner if necessary.       2. Asthma: Moderately persistent symptoms:  Continue on Advair 250 mcg BID.  Use Ventolin as needed and 20-30 min prior to exercise.   Continue on Montelukast.      3. GERD:  Continue on PPI     Please excuse any misspellings or unintended errors related to the Dragon speech recognition software used to dictate this note.     Lawrence Balderas MD   12/19/2023

## 2024-03-05 DIAGNOSIS — E78.5 HYPERLIPIDEMIA, UNSPECIFIED HYPERLIPIDEMIA TYPE: ICD-10-CM

## 2024-03-05 RX ORDER — ROSUVASTATIN CALCIUM 5 MG/1
5 TABLET, COATED ORAL DAILY
Qty: 90 TABLET | Refills: 1 | Status: SHIPPED | OUTPATIENT
Start: 2024-03-05 | End: 2024-05-28 | Stop reason: SDUPTHER

## 2024-03-19 DIAGNOSIS — J45.40 MODERATE PERSISTENT ASTHMA WITHOUT COMPLICATION (HHS-HCC): ICD-10-CM

## 2024-03-19 RX ORDER — FLUTICASONE PROPIONATE AND SALMETEROL 250; 50 UG/1; UG/1
1 POWDER RESPIRATORY (INHALATION)
Qty: 3 EACH | Refills: 3 | Status: SHIPPED | OUTPATIENT
Start: 2024-03-19 | End: 2024-04-01 | Stop reason: SDUPTHER

## 2024-04-01 DIAGNOSIS — J45.40 MODERATE PERSISTENT ASTHMA WITHOUT COMPLICATION (HHS-HCC): ICD-10-CM

## 2024-04-01 RX ORDER — FLUTICASONE PROPIONATE AND SALMETEROL 250; 50 UG/1; UG/1
1 POWDER RESPIRATORY (INHALATION)
Qty: 3 EACH | Refills: 3 | Status: SHIPPED | OUTPATIENT
Start: 2024-04-01 | End: 2025-04-01

## 2024-04-24 DIAGNOSIS — J45.40 MODERATE PERSISTENT ASTHMA WITHOUT COMPLICATION (HHS-HCC): Primary | ICD-10-CM

## 2024-04-25 RX ORDER — MONTELUKAST SODIUM 10 MG/1
10 TABLET ORAL NIGHTLY
Qty: 90 TABLET | Refills: 3 | Status: SHIPPED | OUTPATIENT
Start: 2024-04-25 | End: 2024-05-13 | Stop reason: SDUPTHER

## 2024-05-13 DIAGNOSIS — J45.40 MODERATE PERSISTENT ASTHMA WITHOUT COMPLICATION (HHS-HCC): ICD-10-CM

## 2024-05-14 RX ORDER — MONTELUKAST SODIUM 10 MG/1
10 TABLET ORAL NIGHTLY
Qty: 90 TABLET | Refills: 3 | Status: SHIPPED | OUTPATIENT
Start: 2024-05-14 | End: 2025-05-14

## 2024-05-28 DIAGNOSIS — E78.5 HYPERLIPIDEMIA, UNSPECIFIED HYPERLIPIDEMIA TYPE: ICD-10-CM

## 2024-05-28 RX ORDER — ROSUVASTATIN CALCIUM 5 MG/1
5 TABLET, COATED ORAL DAILY
Qty: 90 TABLET | Refills: 1 | Status: SHIPPED | OUTPATIENT
Start: 2024-05-28

## 2024-06-12 DIAGNOSIS — F32.A MILD DEPRESSION: ICD-10-CM

## 2024-06-12 RX ORDER — BUPROPION HYDROCHLORIDE 150 MG/1
150 TABLET ORAL DAILY
Qty: 90 TABLET | Refills: 2 | Status: SHIPPED | OUTPATIENT
Start: 2024-06-12

## 2024-07-08 DIAGNOSIS — J45.40 MODERATE PERSISTENT ASTHMA WITHOUT COMPLICATION (HHS-HCC): ICD-10-CM

## 2024-07-08 RX ORDER — FLUTICASONE PROPIONATE AND SALMETEROL 250; 50 UG/1; UG/1
1 POWDER RESPIRATORY (INHALATION)
Qty: 3 EACH | Refills: 3 | Status: SHIPPED | OUTPATIENT
Start: 2024-07-08 | End: 2025-07-08

## 2024-07-31 DIAGNOSIS — J45.40 MODERATE PERSISTENT ASTHMA WITHOUT COMPLICATION (HHS-HCC): ICD-10-CM

## 2024-07-31 RX ORDER — MONTELUKAST SODIUM 10 MG/1
10 TABLET ORAL NIGHTLY
Qty: 90 TABLET | Refills: 3 | Status: SHIPPED | OUTPATIENT
Start: 2024-07-31 | End: 2025-07-31

## 2024-10-07 DIAGNOSIS — E78.5 HYPERLIPIDEMIA, UNSPECIFIED HYPERLIPIDEMIA TYPE: ICD-10-CM

## 2024-10-07 RX ORDER — ROSUVASTATIN CALCIUM 5 MG/1
5 TABLET, COATED ORAL DAILY
Qty: 90 TABLET | Refills: 1 | Status: SHIPPED | OUTPATIENT
Start: 2024-10-07

## 2024-11-18 DIAGNOSIS — R73.9 BORDERLINE HYPERGLYCEMIA: Primary | ICD-10-CM

## 2024-11-18 DIAGNOSIS — Z12.31 VISIT FOR SCREENING MAMMOGRAM: ICD-10-CM

## 2024-11-18 DIAGNOSIS — E78.2 MIXED HYPERLIPIDEMIA: ICD-10-CM

## 2024-11-18 DIAGNOSIS — E55.9 VITAMIN D DEFICIENCY: ICD-10-CM

## 2024-11-18 DIAGNOSIS — J41.1 MUCOPURULENT CHRONIC BRONCHITIS (MULTI): ICD-10-CM

## 2024-12-03 LAB — HEMOGLOBIN A1C/HEMOGLOBIN TOTAL IN BLOOD: 6.1 %

## 2024-12-04 NOTE — PROGRESS NOTES
Subjective   Reason for Visit: Latrice Kinsey is an 79 y.o. female here for her Subsequent Medicare Assessment and follow up              She is scheduled for colonoscopy in 1/25    She had cataract surgery right eye 10/24.  She is not happy with her visual outcome. She still needs glasses for improved vision.   The cataract in the left eye is unchanged and they will monitor for now         HEALTH:  PAP 10/13 and no longer needs to repeat   Mammo  11/18, 11/19, 11/20, 11/21, 11/22, 11/23, 4/24   BD 10/13 T-0.3, 12/18, 12/21 T-1.6, 11/23 T-2.5 hip  -- FRAX score = 16 % 11/2023  Colon 6/11, 8/16 and Q 5 - She is scheduled for colonoscopy in 1/25   EKG 10/13, 10/15, 11/17, 5/19, 11/20, 9/21, repeat 2025  Urine 10/13, 10/15, 11/16, 11/17, 11/18, 11/19  Hep C 11/18 -  Flu does not get due to egg white allergy   TD 2011, 1/22/23  RSV 12/11/23  Pneumovax 2013 last one   Prevnar 10/15   Zostavax never and declined   Shingrix 3/24/24 and 5/31/24  Moderna CVD 2/21 and 3/21 booster 11/21, 5/22, 10/22, 1/24, 10/24  Ophth- Last seen in 10/24. She has glaucoma and using eye drops OU. No MD. She had cataract surgery right eye 10/24. The cataract in the left eye is unchanged and they will monitor for now       Patient Care Team:  Luiza Manzo MD as PCP - General  Luiza Manzo MD as PCP - [facility] Attributed Provider     Review of Systems  All systems negative except those listed in the HPI      Past Medical, Surgical, and Family History reviewed and updated in chart.  Reviewed all medications by prescribing practitioner or clinical pharmacist   (such as prescriptions, OTCs, herbal therapies and supplements) and documented in the medical record      Objective   Vitals:  Visit Vitals  /74   Pulse 81   Temp 36 °C (96.8 °F)    Body mass index is 26.64 kg/m².     Physical Exam  Vitals reviewed.   Constitutional:       Appearance: Normal appearance. She is normal weight.   HENT:      Head: Normocephalic.      Right Ear:  Tympanic membrane, ear canal and external ear normal.      Left Ear: Tympanic membrane, ear canal and external ear normal.      Nose: Nose normal.      Mouth/Throat:      Pharynx: Oropharynx is clear.   Eyes:      Conjunctiva/sclera: Conjunctivae normal.   Cardiovascular:      Rate and Rhythm: Normal rate and regular rhythm.      Pulses: Normal pulses.      Heart sounds: Normal heart sounds.   Pulmonary:      Effort: Pulmonary effort is normal.      Breath sounds: Normal breath sounds. No wheezing or rhonchi.   Abdominal:      General: Bowel sounds are normal.      Palpations: Abdomen is soft.   Musculoskeletal:         General: Normal range of motion.      Cervical back: Normal range of motion and neck supple.   Skin:     General: Skin is warm.   Neurological:      General: No focal deficit present.      Mental Status: She is alert and oriented to person, place, and time.   Psychiatric:         Mood and Affect: Mood normal.         Behavior: Behavior normal.         Thought Content: Thought content normal.         Judgment: Judgment normal.       Assessment & Plan       Problem List Items Addressed This Visit       Asthma                   Asthmatic bronchitis , chronic (Multi)                   Borderline hyperglycemia                   Esophageal reflux                   Hyperlipidemia                   Lung nodules                   Mild depression                   S/P Nissen fundoplication (without gastrostomy tube) procedure                    Other Visit Diagnoses       Routine general medical examination at health care facility    -  Primary    Relevant Orders    1 Year Follow Up In [facility] - PCP - Wellness Exam    S/P cataract surgery, right        Prediabetes        Relevant Medications    empagliflozin (Jardiance) 10 mg    Renal insufficiency        Stage 3b chronic kidney disease (Multi)        Relevant Medications    empagliflozin (Jardiance) 10 mg    Other Relevant Orders    Referral to  "Nephrology    Hypothyroidism, unspecified type               Subsequent Medicare Assessment and follow up completed  Reviewed her labs from outside the clinic 12/24  TSH 5.0, Free T4 1.27 and antibodies normal - we will continue to monitor   Cr 1.98, was 1.25  Alk phos 135  LDL 79,   CBC stable  HbA1c 6.1 %      Medicare Wellness completed  -  Discussed healthy diet and regular exercise.    -  Physical exam overall unremarkable. Immunizations reviewed and updated accordingly. Healthy lifestyle choices discussed (tobacco avoidance, appropriate alcohol use, avoidance of illicit substances).   -  Patient is wearing seatbelt.   -  Screening lab work ordered as indicated.    -  Age appropriate screening tests reviewed with patient.         We spent 15 minutes discussing depression screen and there is nothing found that is of concern for underling depression. The PQH form was filled and the meds reviewed. No depression to report      We spent 5 minutes discussing alcohol use and there are no concerns about overuse. The 5 min was spent in going over any issues of use of alcohol. No EtOH use.      She has grab bars in the shower.  She has not fallen recently and no risk of falls in the house   She has good lighting around the house and functioning smoke detectors.     She was  in 6/2022 with 2 sons   She has a previous hx of tobacco use, quit in 2001, smoked for 30 years.    Her  passed in 6/2022, he had dementia   Her oldest son is staying with her. In June 2024 he will have been there 2 years   His ex- wife passed a year ago \"she drank herself to death\"  He is not able to see his children. The grandchildren are with their mothers parents      She has noticed not hearing   She declines audiology consult 11/2022  She saw ENT and they did do a hearing test and hearing loss was mild     She tripped while on asphalt in 9/24  She landed on her hands and knees   She went to the ED 9/9/24 and no fractures noted  She " has not had trouble with her knees since      Her weight in office is 141 with BMI of 26.64. We spent 15 minutes discussing diet and weight loss. The struggle of weight loss persists    Explained goal for BMI to be 25 or less. She restarted bowling   Recommend she remain busy throughout the day      BP have been in normal range in office. We will continue to monitor.   She has episodes of increased HR when her anxiety is elevated   EKG 9/2021 showed sinus tach at 107. Normal axis, normal intervals   Recommend she monitor her BP at home and call with elevated readings      I have spent 15 min face to face with this patient discussing their cardiac risk   We discussed the patients cardiovascular risk. If needed, lifestyle modifications recommended including: behavioral therapies of nutrition choices, exercise and eliminate habits that are contributing to their cardiac risk. We agreed to a plan to decrease his cardiovascular risks. Discussed ASA. Reviewed Guidelines and approved recommendations made to patient.   The patient's 10 yr CV risk was estimated at : ACO score 3/6 IO 12/2024     HLD: LDL 79 on labs in 2024  Explained goal for LDL to be less than 100 and ideally less than 70   Continue rosuvastatin 5 mg QD   Ca cardiac score + 246. Explained goal for score to be less than 100 11/2020   Recommend she look into a plant based/ whole foods diet      Borderline hyperglycemia: HbA1c 6.1 % on labs in 2024.   Explained this is medication related   Recommend she look into a plant based/ whole foods diet      - s/p VATS procedure at [facility] with Dr Patel 10/2/2021:   CXR 9/2021 showed moderate right sided pleural effusion with right basilar opacity concerning for combination of mass versus pneumonia versus compressive atelectasis   CT 9/21 partially cavitating masslike consolidation in the medial right lower lung   US 9/2021 showed loculated pleural effusion with hyperechoic fluid suggestive of parapneumonic effusion or  "empyema  Repeat CT 9/29/2021 improvement in right pleural effusions however pockets of loculations still present   Cytology negative for malignant cells   She saw Dr Castellanos in 12/23 and continue Advair, Singulair 10 mg daily and ventolin prn      Mild CRI: Cr 1.98, Alk phos 135 on labs in 2024, Cr was 1.25 labs prior. Discussed Jardiance and Farxiga with patient today along with possible side effects. Prescription sent in for Jardiance 10 mg daily.   She needs to keep hydrated   She is to avoid NSAIDs but can take Tylenol prn   Recommend and orders placed for evaluation with Dr Turner 12/24      Asthma :    Continue Advair inhaler BID, Singulair 10 mg daily and ventolin prn   Pulm feels she has asthmatic bronchitis and not asthma   PFT 11/12/18  decreased overall volume with Dr Castellanos.   She saw Dr Castellanos in 12/23     GERD:  Continue omeprazole 20 mg QOD or 5 days a week.   Being on omeprazole has improved her kidney function   EGD in 11/19 was normal      Mild depression that is situational mostly:    Her  passed in 6/2022. They were together for 48 years.   She did grief therapy.    She has 2- 3 close friends that she can call on when her son is not around   Her oldest son is staying with her. In June 2024 he will have been there 2 years   His ex- wife passed a year ago \"she drank herself to death\"  He is not able to see his children. The grandchildren are with their mothers parents   She could not tolerate side effects with Lexapro.   Continue Wellbutrin  mg daily   Discussed acupuncture and mindfulness.  She can do chair Yoga or Zach [employer]      Constipation:  Family Hx of sister and mother with rectal Ca so she gets colonoscopy every 5 yrs.   Colon 11/20/19 and Q 5 EGD 11/2020 was normal      Bilateral hip pain:  Recommend she try OTC Voltaren Gel prn  Avoid NSAIDs. She can take Tylenol prn   Recommend she try OTC pain relieving creams with Lidocaine prn      Right knee pain:  She had orthoscopic surgery for " the meniscus tear.  She still wears the brace because she gets swelling back of the knee and medially   Avoid NSAIDs. She can take Tylenol prn   Recommend she try OTC pain relieving creams with Lidocaine prn      She had surgery with ortho in 5/19 for left carpal tunnel release   She will continue to follow with ortho      She had a 5 x 15 mm sebaceous cyst removed from right temporal area 8/13/19 and Bx was SCC. She had a Moh's and then did topical chemo for 2 days    She had a wart removed from her 5th toe with Dr Brandon in 2022  She is seeing Dr Brandon in dermatology routinely      Vitamin D deficiency - Labs ordered and we will adjust if indicated  12/24  Discontinue scripted vitamin D due to concerns for bone loss with higher doses   Recommend taking OTC Vitamin D3 5000 UT daily      PAP normal in 10/13 and no does not need another one  Vaginal atrophy is controlled with Estrace cream 2 x weekly and will continue  She had good results with Vagifem but it was too costly     Mammo was normal in 4/24   Breast exam normal 12/2024    BD 11/23 T-2.5 hip. Continue OTC Calcium 600 mg BID, OTC Vitamin D3 5000 UT daily and eat 2 servings of calcium enriched foods daily.    Discussed importance of weight bearing exercises      Colon 11/20/19 and Q 5. She is scheduled for colonoscopy in 1/25  EGD 11/2020 was normal      Ophth-    Last seen in 10/24. She has glaucoma and using eye drops OU. No MD    She had cataract surgery right eye 10/24. She is not happy with her visual outcome. She still needs glasses for improved vision. The cataract in the left eye is unchanged and they will monitor for now      I spent 15 min with the patient discussing their wishes for end of life choices.   We discussed the need for a Living Will and that wishes should be discussed with Family. The DNR status was reviewed, and we discussed the options of this and, the DNR _CC options as well.   We also went over how important it was to have these  choices written down and clear for any surviving family so that their wishes are followed   The patient and I came to to following agreement : Her sister (Gissel) is her medical POA and she has a living will. Her sister is aware of her wishes.   She is DNR for heroic measures. She wants to be cremated   Recommend she bring a copy of her LW and MPOA in office to have scanned in her chart 12/2024       Hep C 11/18 -  Flu does not get due to egg white allergy   TD 2011, 1/22/23  RSV 12/11/23  Pneumovax 2013 last one   Prevnar 10/15   Zostavax never and declined   Shingrix 3/24/24 and 5/31/24  Moderna CVD 2/21 and 3/21 booster 11/21, 5/22, 10/22, 1/24, 10/24     Some elements in the chart were copied from Dr. Manzo's last office visit with patient. Notes have been updated where appropriate, and reflect my current medical decision making from today.      Return in 6 months for follow up or sooner if needed   (MCR due 12/2025, last mcr 12/5/24)     Scribe Attestation  By signing my name below, I, Josie Krunal , Scribe   attest that this documentation has been prepared under the direction and in the presence of Luiza Manzo MD.

## 2024-12-05 ENCOUNTER — APPOINTMENT (OUTPATIENT)
Dept: PRIMARY CARE | Facility: CLINIC | Age: 79
End: 2024-12-05
Payer: MEDICARE

## 2024-12-05 VITALS
DIASTOLIC BLOOD PRESSURE: 74 MMHG | SYSTOLIC BLOOD PRESSURE: 134 MMHG | OXYGEN SATURATION: 100 % | BODY MASS INDEX: 26.62 KG/M2 | TEMPERATURE: 96.8 F | HEIGHT: 61 IN | HEART RATE: 81 BPM | WEIGHT: 141 LBS

## 2024-12-05 DIAGNOSIS — K21.00 GASTROESOPHAGEAL REFLUX DISEASE WITH ESOPHAGITIS WITHOUT HEMORRHAGE: ICD-10-CM

## 2024-12-05 DIAGNOSIS — J45.42 MODERATE PERSISTENT ASTHMA WITH STATUS ASTHMATICUS (HHS-HCC): ICD-10-CM

## 2024-12-05 DIAGNOSIS — F32.A MILD DEPRESSION: ICD-10-CM

## 2024-12-05 DIAGNOSIS — R91.8 LUNG NODULES: ICD-10-CM

## 2024-12-05 DIAGNOSIS — Z98.890 S/P NISSEN FUNDOPLICATION (WITHOUT GASTROSTOMY TUBE) PROCEDURE: ICD-10-CM

## 2024-12-05 DIAGNOSIS — E03.9 HYPOTHYROIDISM, UNSPECIFIED TYPE: ICD-10-CM

## 2024-12-05 DIAGNOSIS — N28.9 RENAL INSUFFICIENCY: ICD-10-CM

## 2024-12-05 DIAGNOSIS — R73.03 PREDIABETES: ICD-10-CM

## 2024-12-05 DIAGNOSIS — N18.32 STAGE 3B CHRONIC KIDNEY DISEASE (MULTI): ICD-10-CM

## 2024-12-05 DIAGNOSIS — E78.2 MIXED HYPERLIPIDEMIA: ICD-10-CM

## 2024-12-05 DIAGNOSIS — Z98.41 S/P CATARACT SURGERY, RIGHT: ICD-10-CM

## 2024-12-05 DIAGNOSIS — J44.89 ASTHMATIC BRONCHITIS , CHRONIC (MULTI): ICD-10-CM

## 2024-12-05 DIAGNOSIS — Z00.00 ROUTINE GENERAL MEDICAL EXAMINATION AT HEALTH CARE FACILITY: Primary | ICD-10-CM

## 2024-12-05 DIAGNOSIS — R73.9 BORDERLINE HYPERGLYCEMIA: ICD-10-CM

## 2024-12-05 PROCEDURE — 99214 OFFICE O/P EST MOD 30 MIN: CPT | Performed by: INTERNAL MEDICINE

## 2024-12-05 PROCEDURE — G0439 PPPS, SUBSEQ VISIT: HCPCS | Performed by: INTERNAL MEDICINE

## 2024-12-05 PROCEDURE — 1159F MED LIST DOCD IN RCRD: CPT | Performed by: INTERNAL MEDICINE

## 2024-12-05 PROCEDURE — 1123F ACP DISCUSS/DSCN MKR DOCD: CPT | Performed by: INTERNAL MEDICINE

## 2024-12-05 PROCEDURE — 1160F RVW MEDS BY RX/DR IN RCRD: CPT | Performed by: INTERNAL MEDICINE

## 2024-12-05 PROCEDURE — 1170F FXNL STATUS ASSESSED: CPT | Performed by: INTERNAL MEDICINE

## 2024-12-05 PROCEDURE — 1158F ADVNC CARE PLAN TLK DOCD: CPT | Performed by: INTERNAL MEDICINE

## 2024-12-05 ASSESSMENT — ACTIVITIES OF DAILY LIVING (ADL)
BATHING: INDEPENDENT
DRESSING: INDEPENDENT
MANAGING_FINANCES: INDEPENDENT
DOING_HOUSEWORK: INDEPENDENT
GROCERY_SHOPPING: INDEPENDENT
TAKING_MEDICATION: INDEPENDENT

## 2024-12-05 ASSESSMENT — PATIENT HEALTH QUESTIONNAIRE - PHQ9
2. FEELING DOWN, DEPRESSED OR HOPELESS: NOT AT ALL
1. LITTLE INTEREST OR PLEASURE IN DOING THINGS: NOT AT ALL
2. FEELING DOWN, DEPRESSED OR HOPELESS: NOT AT ALL
SUM OF ALL RESPONSES TO PHQ9 QUESTIONS 1 AND 2: 0
1. LITTLE INTEREST OR PLEASURE IN DOING THINGS: NOT AT ALL
SUM OF ALL RESPONSES TO PHQ9 QUESTIONS 1 AND 2: 0

## 2024-12-05 NOTE — PROGRESS NOTES
"Subjective   Reason for Visit: Latrice Kinsey is an 79 y.o. female here for a Medicare Wellness visit.     Past Medical, Surgical, and Family History reviewed and updated in chart.    Reviewed all medications by prescribing practitioner or clinical pharmacist (such as prescriptions, OTCs, herbal therapies and supplements) and documented in the medical record.    HPI    Patient Care Team:  Luiza Manzo MD as PCP - General  Lu[facility] Manzo MD as PCP [facility]     Review of Systems    Objective   Vitals:  /74   Pulse 81   Temp 36 °C (96.8 °F)   Ht 1.549 m (5' 1\")   Wt 64 kg (141 lb)   SpO2 100%   BMI 26.64 kg/m²       Physical Exam    Assessment & Plan  Routine general medical examination at health care facility    Orders:    1 Year Follow Up In [facility] - PCP - Wellness Exam; Future    Mixed hyperlipidemia         Borderline hyperglycemia         S/P Nissen fundoplication (without gastrostomy tube) procedure         Mild depression         Moderate persistent asthma with status asthmaticus ([facility])         Lung nodules         Gastroesophageal reflux disease with esophagitis without hemorrhage         S/P cataract surgery, right         Asthmatic bronchitis , chronic (Multi)                   "

## 2025-01-03 DIAGNOSIS — J45.40 MODERATE PERSISTENT ASTHMA WITHOUT COMPLICATION (HHS-HCC): ICD-10-CM

## 2025-01-05 RX ORDER — MONTELUKAST SODIUM 10 MG/1
10 TABLET ORAL NIGHTLY
Qty: 90 TABLET | Refills: 3 | Status: SHIPPED | OUTPATIENT
Start: 2025-01-05 | End: 2026-01-05

## 2025-01-23 DIAGNOSIS — E78.5 HYPERLIPIDEMIA, UNSPECIFIED HYPERLIPIDEMIA TYPE: ICD-10-CM

## 2025-01-23 RX ORDER — ROSUVASTATIN CALCIUM 5 MG/1
5 TABLET, COATED ORAL DAILY
Qty: 90 TABLET | Refills: 3 | Status: SHIPPED | OUTPATIENT
Start: 2025-01-23

## 2025-01-28 DIAGNOSIS — F32.A MILD DEPRESSION: ICD-10-CM

## 2025-01-28 RX ORDER — BUPROPION HYDROCHLORIDE 150 MG/1
150 TABLET ORAL DAILY
Qty: 90 TABLET | Refills: 3 | Status: SHIPPED | OUTPATIENT
Start: 2025-01-28

## 2025-02-26 DIAGNOSIS — N18.32 STAGE 3B CHRONIC KIDNEY DISEASE (MULTI): ICD-10-CM

## 2025-02-26 DIAGNOSIS — R73.03 PREDIABETES: ICD-10-CM

## 2025-03-05 DIAGNOSIS — R73.03 PREDIABETES: ICD-10-CM

## 2025-03-05 DIAGNOSIS — N18.32 STAGE 3B CHRONIC KIDNEY DISEASE (MULTI): ICD-10-CM

## 2025-03-10 DIAGNOSIS — F32.A MILD DEPRESSION: ICD-10-CM

## 2025-03-10 RX ORDER — BUPROPION HYDROCHLORIDE 150 MG/1
150 TABLET ORAL DAILY
Qty: 90 TABLET | Refills: 3 | Status: SHIPPED | OUTPATIENT
Start: 2025-03-10

## 2025-04-03 DIAGNOSIS — J45.40 MODERATE PERSISTENT ASTHMA WITHOUT COMPLICATION (HHS-HCC): ICD-10-CM

## 2025-04-03 RX ORDER — FLUTICASONE PROPIONATE AND SALMETEROL 250; 50 UG/1; UG/1
1 POWDER RESPIRATORY (INHALATION)
Qty: 3 EACH | Refills: 3 | Status: SHIPPED | OUTPATIENT
Start: 2025-04-03 | End: 2026-04-03

## 2025-04-14 ENCOUNTER — TELEPHONE (OUTPATIENT)
Facility: CLINIC | Age: 80
End: 2025-04-14
Payer: MEDICARE

## 2025-04-14 NOTE — TELEPHONE ENCOUNTER
Patient called in saying that on April 10th the Adviar disc was sent to [facility] was not approved. A prior auth is needed and I only have 1 week left of my medication.     Pror auth was started today in the system.

## 2025-05-30 ENCOUNTER — APPOINTMENT (OUTPATIENT)
Facility: CLINIC | Age: 80
End: 2025-05-30
Payer: MEDICARE

## 2025-05-30 VITALS
HEART RATE: 76 BPM | BODY MASS INDEX: 24.55 KG/M2 | SYSTOLIC BLOOD PRESSURE: 133 MMHG | HEIGHT: 61 IN | WEIGHT: 130 LBS | DIASTOLIC BLOOD PRESSURE: 71 MMHG | RESPIRATION RATE: 16 BRPM

## 2025-05-30 DIAGNOSIS — N18.32 STAGE 3B CHRONIC KIDNEY DISEASE (MULTI): ICD-10-CM

## 2025-05-30 PROCEDURE — 1123F ACP DISCUSS/DSCN MKR DOCD: CPT | Performed by: INTERNAL MEDICINE

## 2025-05-30 PROCEDURE — 99203 OFFICE O/P NEW LOW 30 MIN: CPT | Performed by: INTERNAL MEDICINE

## 2025-05-30 PROCEDURE — 1159F MED LIST DOCD IN RCRD: CPT | Performed by: INTERNAL MEDICINE

## 2025-05-30 PROCEDURE — 83521 IG LIGHT CHAINS FREE EACH: CPT

## 2025-05-30 PROCEDURE — 1160F RVW MEDS BY RX/DR IN RCRD: CPT | Performed by: INTERNAL MEDICINE

## 2025-05-30 PROCEDURE — 84155 ASSAY OF PROTEIN SERUM: CPT

## 2025-05-30 PROCEDURE — 84165 PROTEIN E-PHORESIS SERUM: CPT

## 2025-05-30 PROCEDURE — 86334 IMMUNOFIX E-PHORESIS SERUM: CPT

## 2025-05-30 NOTE — PROGRESS NOTES
78 yo CF  No urinary symptoms except difficulty with emptying recently    NSAIDs frequently 2-3 times per week for years, stopped 2022  No herbals    PMH  Asthma  DLD  Cavitary PNA  Prediabetic  Nissen fundiplication 1981    SOCHx  Quit 20 years, prior 15 py history  No ETOH  Lives with oldest son      FMHx  No CKD  M colon cancer  F heart disease    ROS OTW 10/14 systems except as above    NAD  Sclera AI s inj  MMM, no sores  Deferred secondary to COVID  No edema  No tremor  No rash  Appropriate    MATY vs CKD, peripheral eosinophila and omeprazole concerning for AIN    Labs today   Stop omeprazole  Renal ultrasound  VV in 2 weeks to review labs

## 2025-05-31 ENCOUNTER — APPOINTMENT (OUTPATIENT)
Dept: LAB | Facility: HOSPITAL | Age: 80
End: 2025-05-31
Payer: MEDICARE

## 2025-05-31 LAB
ALBUMIN SERPL-MCNC: 4.5 G/DL (ref 3.6–5.1)
ALBUMIN/CREAT UR: NORMAL
APPEARANCE UR: CLEAR
BACTERIA #/AREA URNS HPF: ABNORMAL /HPF
BASOPHILS # BLD AUTO: 67 CELLS/UL (ref 0–200)
BASOPHILS NFR BLD AUTO: 1 %
BILIRUB UR QL STRIP: NEGATIVE
BUN SERPL-MCNC: 16 MG/DL (ref 7–25)
BUN/CREAT SERPL: 12 (CALC) (ref 6–22)
CALCIUM SERPL-MCNC: 9.6 MG/DL (ref 8.6–10.4)
CHLORIDE SERPL-SCNC: 108 MMOL/L (ref 98–110)
CO2 SERPL-SCNC: 26 MMOL/L (ref 20–32)
COLOR UR: YELLOW
CREAT SERPL-MCNC: 1.36 MG/DL (ref 0.6–1)
CREAT UR-MCNC: NORMAL MG/DL
CREAT UR-MCNC: NORMAL MG/DL
EGFRCR SERPLBLD CKD-EPI 2021: 40 ML/MIN/1.73M2
EOSINOPHIL # BLD AUTO: 268 CELLS/UL (ref 15–500)
EOSINOPHIL NFR BLD AUTO: 4 %
ERYTHROCYTE [DISTWIDTH] IN BLOOD BY AUTOMATED COUNT: 13.2 % (ref 11–15)
GLUCOSE SERPL-MCNC: 114 MG/DL (ref 65–99)
GLUCOSE UR QL STRIP: ABNORMAL
HCT VFR BLD AUTO: 45.4 % (ref 35–45)
HGB BLD-MCNC: 14.4 G/DL (ref 11.7–15.5)
HGB UR QL STRIP: NEGATIVE
HYALINE CASTS #/AREA URNS LPF: ABNORMAL /LPF
KETONES UR QL STRIP: NEGATIVE
LEUKOCYTE ESTERASE UR QL STRIP: ABNORMAL
LYMPHOCYTES # BLD AUTO: 730 CELLS/UL (ref 850–3900)
LYMPHOCYTES NFR BLD AUTO: 10.9 %
MCH RBC QN AUTO: 29.1 PG (ref 27–33)
MCHC RBC AUTO-ENTMCNC: 31.7 G/DL (ref 32–36)
MCV RBC AUTO: 91.7 FL (ref 80–100)
MICROALBUMIN UR-MCNC: NORMAL
MONOCYTES # BLD AUTO: 462 CELLS/UL (ref 200–950)
MONOCYTES NFR BLD AUTO: 6.9 %
NEUTROPHILS # BLD AUTO: 5172 CELLS/UL (ref 1500–7800)
NEUTROPHILS NFR BLD AUTO: 77.2 %
NITRITE UR QL STRIP: NEGATIVE
PH UR STRIP: 5.5 [PH] (ref 5–8)
PHOSPHATE SERPL-MCNC: 3.8 MG/DL (ref 2.1–4.3)
PLATELET # BLD AUTO: 219 THOUSAND/UL (ref 140–400)
PMV BLD REES-ECKER: 10 FL (ref 7.5–12.5)
POTASSIUM SERPL-SCNC: 3.9 MMOL/L (ref 3.5–5.3)
PROT SERPL-MCNC: 7.1 G/DL (ref 6.4–8.2)
PROT UR QL STRIP: NEGATIVE
PROT UR-MCNC: NORMAL G/DL
PROT/CREAT UR: NORMAL MG/G{CREAT}
PROT/CREAT UR: NORMAL MG/G{CREAT}
RBC # BLD AUTO: 4.95 MILLION/UL (ref 3.8–5.1)
RBC #/AREA URNS HPF: ABNORMAL /HPF
SERVICE CMNT-IMP: ABNORMAL
SODIUM SERPL-SCNC: 142 MMOL/L (ref 135–146)
SP GR UR STRIP: 1.02 (ref 1–1.03)
SQUAMOUS #/AREA URNS HPF: ABNORMAL /HPF
WBC # BLD AUTO: 6.7 THOUSAND/UL (ref 3.8–10.8)
WBC #/AREA URNS HPF: ABNORMAL /HPF

## 2025-06-01 LAB
KAPPA LC SERPL-MCNC: 2.21 MG/DL (ref 0.33–1.94)
KAPPA LC/LAMBDA SER: 1.35 {RATIO} (ref 0.26–1.65)
LAMBDA LC SERPL-MCNC: 1.64 MG/DL (ref 0.57–2.63)

## 2025-06-02 LAB
ALBUMIN SERPL-MCNC: 4.5 G/DL (ref 3.6–5.1)
ALBUMIN/CREAT UR: 4 MG/G CREAT
APPEARANCE UR: CLEAR
BACTERIA #/AREA URNS HPF: ABNORMAL /HPF
BASOPHILS # BLD AUTO: 67 CELLS/UL (ref 0–200)
BASOPHILS NFR BLD AUTO: 1 %
BILIRUB UR QL STRIP: NEGATIVE
BUN SERPL-MCNC: 16 MG/DL (ref 7–25)
BUN/CREAT SERPL: 12 (CALC) (ref 6–22)
CALCIUM SERPL-MCNC: 9.6 MG/DL (ref 8.6–10.4)
CHLORIDE SERPL-SCNC: 108 MMOL/L (ref 98–110)
CO2 SERPL-SCNC: 26 MMOL/L (ref 20–32)
COLOR UR: YELLOW
CREAT SERPL-MCNC: 1.36 MG/DL (ref 0.6–1)
CREAT UR-MCNC: 83 MG/DL (ref 20–275)
CREAT UR-MCNC: 83 MG/DL (ref 20–275)
EGFRCR SERPLBLD CKD-EPI 2021: 40 ML/MIN/1.73M2
EOSINOPHIL # BLD AUTO: 268 CELLS/UL (ref 15–500)
EOSINOPHIL NFR BLD AUTO: 4 %
ERYTHROCYTE [DISTWIDTH] IN BLOOD BY AUTOMATED COUNT: 13.2 % (ref 11–15)
GLUCOSE SERPL-MCNC: 114 MG/DL (ref 65–99)
GLUCOSE UR QL STRIP: ABNORMAL
HCT VFR BLD AUTO: 45.4 % (ref 35–45)
HGB BLD-MCNC: 14.4 G/DL (ref 11.7–15.5)
HGB UR QL STRIP: NEGATIVE
HYALINE CASTS #/AREA URNS LPF: ABNORMAL /LPF
KETONES UR QL STRIP: NEGATIVE
LEUKOCYTE ESTERASE UR QL STRIP: ABNORMAL
LYMPHOCYTES # BLD AUTO: 730 CELLS/UL (ref 850–3900)
LYMPHOCYTES NFR BLD AUTO: 10.9 %
MCH RBC QN AUTO: 29.1 PG (ref 27–33)
MCHC RBC AUTO-ENTMCNC: 31.7 G/DL (ref 32–36)
MCV RBC AUTO: 91.7 FL (ref 80–100)
MICROALBUMIN UR-MCNC: 0.3 MG/DL
MONOCYTES # BLD AUTO: 462 CELLS/UL (ref 200–950)
MONOCYTES NFR BLD AUTO: 6.9 %
NEUTROPHILS # BLD AUTO: 5172 CELLS/UL (ref 1500–7800)
NEUTROPHILS NFR BLD AUTO: 77.2 %
NITRITE UR QL STRIP: NEGATIVE
PH UR STRIP: 5.5 [PH] (ref 5–8)
PHOSPHATE SERPL-MCNC: 3.8 MG/DL (ref 2.1–4.3)
PLATELET # BLD AUTO: 219 THOUSAND/UL (ref 140–400)
PMV BLD REES-ECKER: 10 FL (ref 7.5–12.5)
POTASSIUM SERPL-SCNC: 3.9 MMOL/L (ref 3.5–5.3)
PROT UR QL STRIP: NEGATIVE
PROT UR-MCNC: 11 MG/DL (ref 5–24)
PROT/CREAT UR: 0.13 MG/MG CREAT (ref 0.02–0.18)
PROT/CREAT UR: 133 MG/G CREAT (ref 24–184)
RBC # BLD AUTO: 4.95 MILLION/UL (ref 3.8–5.1)
RBC #/AREA URNS HPF: ABNORMAL /HPF
SERVICE CMNT-IMP: ABNORMAL
SODIUM SERPL-SCNC: 142 MMOL/L (ref 135–146)
SP GR UR STRIP: 1.02 (ref 1–1.03)
SQUAMOUS #/AREA URNS HPF: ABNORMAL /HPF
WBC # BLD AUTO: 6.7 THOUSAND/UL (ref 3.8–10.8)
WBC #/AREA URNS HPF: ABNORMAL /HPF

## 2025-06-06 ENCOUNTER — APPOINTMENT (OUTPATIENT)
Dept: RADIOLOGY | Facility: CLINIC | Age: 80
End: 2025-06-06
Payer: MEDICARE

## 2025-06-06 DIAGNOSIS — N18.32 STAGE 3B CHRONIC KIDNEY DISEASE (MULTI): ICD-10-CM

## 2025-06-06 PROCEDURE — 76770 US EXAM ABDO BACK WALL COMP: CPT | Performed by: STUDENT IN AN ORGANIZED HEALTH CARE EDUCATION/TRAINING PROGRAM

## 2025-06-06 PROCEDURE — 76770 US EXAM ABDO BACK WALL COMP: CPT

## 2025-06-07 LAB
ALBUMIN: 4.3 G/DL (ref 3.4–5)
ALPHA 1 GLOBULIN: 0.3 G/DL (ref 0.2–0.6)
ALPHA 2 GLOBULIN: 0.8 G/DL (ref 0.4–1.1)
BETA GLOBULIN: 0.8 G/DL (ref 0.5–1.2)
GAMMA GLOBULIN: 0.9 G/DL (ref 0.5–1.4)
IMMUNOFIXATION COMMENT: NORMAL
PATH REVIEW - SERUM IMMUNOFIXATION: NORMAL
PATH REVIEW-SERUM PROTEIN ELECTROPHORESIS: NORMAL
PROTEIN ELECTROPHORESIS COMMENT: NORMAL

## 2025-06-12 ENCOUNTER — APPOINTMENT (OUTPATIENT)
Dept: NEPHROLOGY | Facility: CLINIC | Age: 80
End: 2025-06-12
Payer: MEDICARE

## 2025-06-12 DIAGNOSIS — N18.31 STAGE 3A CHRONIC KIDNEY DISEASE (MULTI): Primary | ICD-10-CM

## 2025-06-12 PROCEDURE — 1159F MED LIST DOCD IN RCRD: CPT | Performed by: INTERNAL MEDICINE

## 2025-06-12 PROCEDURE — 1160F RVW MEDS BY RX/DR IN RCRD: CPT | Performed by: INTERNAL MEDICINE

## 2025-06-12 PROCEDURE — 99212 OFFICE O/P EST SF 10 MIN: CPT | Performed by: INTERNAL MEDICINE

## 2025-06-12 NOTE — PROGRESS NOTES
An interactive audio and video telecommunication system which permits real time communications between the patient (at the originating site) and provider (at the distant site) was utilized to provide this telehealth service.  Verbal consent was requested and obtained from patient on this date for a telehealth visit.    No new complaints    MATY, improved renal function,  No significant proteinuria  HTN well controlled    No changes to medications  Labs and follow up in 6 months

## 2025-08-26 ENCOUNTER — APPOINTMENT (OUTPATIENT)
Facility: CLINIC | Age: 80
End: 2025-08-26
Payer: MEDICARE

## 2025-08-26 VITALS
HEART RATE: 76 BPM | SYSTOLIC BLOOD PRESSURE: 117 MMHG | DIASTOLIC BLOOD PRESSURE: 73 MMHG | WEIGHT: 134.2 LBS | BODY MASS INDEX: 24.69 KG/M2 | TEMPERATURE: 96.4 F | OXYGEN SATURATION: 98 % | HEIGHT: 62 IN | RESPIRATION RATE: 18 BRPM

## 2025-08-26 DIAGNOSIS — K21.9 GASTROESOPHAGEAL REFLUX DISEASE WITHOUT ESOPHAGITIS: ICD-10-CM

## 2025-08-26 DIAGNOSIS — J45.40 MODERATE PERSISTENT ASTHMA WITHOUT COMPLICATION (HHS-HCC): Primary | ICD-10-CM

## 2025-08-26 PROCEDURE — 99214 OFFICE O/P EST MOD 30 MIN: CPT | Performed by: INTERNAL MEDICINE

## 2025-08-26 PROCEDURE — 1159F MED LIST DOCD IN RCRD: CPT | Performed by: INTERNAL MEDICINE

## 2025-08-26 ASSESSMENT — COLUMBIA-SUICIDE SEVERITY RATING SCALE - C-SSRS
1. IN THE PAST MONTH, HAVE YOU WISHED YOU WERE DEAD OR WISHED YOU COULD GO TO SLEEP AND NOT WAKE UP?: NO
6. HAVE YOU EVER DONE ANYTHING, STARTED TO DO ANYTHING, OR PREPARED TO DO ANYTHING TO END YOUR LIFE?: NO
2. HAVE YOU ACTUALLY HAD ANY THOUGHTS OF KILLING YOURSELF?: NO

## 2025-08-26 ASSESSMENT — ASTHMA QUESTIONNAIRES
QUESTION_3 LAST FOUR WEEKS HOW OFTEN DID YOUR ASTHMA SYMPTOMS (WHEEZING, COUGHING, SHORTNESS OF BREATH, CHEST TIGHTNESS OR PAIN) WAKE YOU UP AT NIGHT OR EARLIER THAN USUAL IN THE MORNING: NOT AT ALL
QUESTION_2 LAST FOUR WEEKS HOW OFTEN HAVE YOU HAD SHORTNESS OF BREATH: NOT AT ALL
QUESTION_4 LAST FOUR WEEKS HOW OFTEN HAVE YOU USED YOUR RESCUE INHALER OR NEBULIZER MEDICATION (SUCH AS ALBUTEROL): ONCE A WEEK OR LESS
QUESTION_5 LAST FOUR WEEKS HOW WOULD YOU RATE YOUR ASTHMA CONTROL: WELL CONTROLLED
QUESTION_1 LAST FOUR WEEKS HOW MUCH OF THE TIME DID YOUR ASTHMA KEEP YOU FROM GETTING AS MUCH DONE AT WORK, SCHOOL OR AT HOME: NONE OF THE TIME
ACT_TOTALSCORE: 23

## 2025-08-26 ASSESSMENT — PATIENT HEALTH QUESTIONNAIRE - PHQ9
2. FEELING DOWN, DEPRESSED OR HOPELESS: NOT AT ALL
SUM OF ALL RESPONSES TO PHQ9 QUESTIONS 1 AND 2: 0
1. LITTLE INTEREST OR PLEASURE IN DOING THINGS: NOT AT ALL

## 2025-12-11 ENCOUNTER — APPOINTMENT (OUTPATIENT)
Dept: PRIMARY CARE | Facility: CLINIC | Age: 80
End: 2025-12-11
Payer: MEDICARE

## 2026-02-26 ENCOUNTER — APPOINTMENT (OUTPATIENT)
Facility: CLINIC | Age: 81
End: 2026-02-26
Payer: MEDICARE